# Patient Record
Sex: FEMALE | Race: BLACK OR AFRICAN AMERICAN | NOT HISPANIC OR LATINO | Employment: FULL TIME | URBAN - METROPOLITAN AREA
[De-identification: names, ages, dates, MRNs, and addresses within clinical notes are randomized per-mention and may not be internally consistent; named-entity substitution may affect disease eponyms.]

---

## 2022-01-24 ENCOUNTER — OFFICE VISIT (OUTPATIENT)
Dept: OBSTETRICS AND GYNECOLOGY | Facility: CLINIC | Age: 27
End: 2022-01-24
Payer: COMMERCIAL

## 2022-01-24 VITALS
WEIGHT: 125 LBS | BODY MASS INDEX: 23.6 KG/M2 | HEIGHT: 61 IN | SYSTOLIC BLOOD PRESSURE: 110 MMHG | DIASTOLIC BLOOD PRESSURE: 62 MMHG

## 2022-01-24 DIAGNOSIS — Z11.3 SCREENING FOR STDS (SEXUALLY TRANSMITTED DISEASES): ICD-10-CM

## 2022-01-24 DIAGNOSIS — Z30.013 ENCOUNTER FOR INITIAL PRESCRIPTION OF INJECTABLE CONTRACEPTIVE: ICD-10-CM

## 2022-01-24 DIAGNOSIS — Z01.419 ENCOUNTER FOR ANNUAL ROUTINE GYNECOLOGICAL EXAMINATION: Primary | ICD-10-CM

## 2022-01-24 DIAGNOSIS — Z12.4 SCREENING FOR CERVICAL CANCER: ICD-10-CM

## 2022-01-24 LAB
B-HCG UR QL: NEGATIVE
CTP QC/QA: YES

## 2022-01-24 PROCEDURE — 3008F BODY MASS INDEX DOCD: CPT | Mod: S$GLB,,,

## 2022-01-24 PROCEDURE — 3074F SYST BP LT 130 MM HG: CPT | Mod: S$GLB,,,

## 2022-01-24 PROCEDURE — 96372 PR INJECTION,THERAP/PROPH/DIAG2ST, IM OR SUBCUT: ICD-10-PCS | Mod: S$GLB,,,

## 2022-01-24 PROCEDURE — 81025 URINE PREGNANCY TEST: CPT | Mod: S$GLB,,,

## 2022-01-24 PROCEDURE — 1160F RVW MEDS BY RX/DR IN RCRD: CPT | Mod: S$GLB,,,

## 2022-01-24 PROCEDURE — 81025 POCT URINE PREGNANCY: ICD-10-PCS | Mod: S$GLB,,,

## 2022-01-24 PROCEDURE — 87624 HPV HI-RISK TYP POOLED RSLT: CPT

## 2022-01-24 PROCEDURE — 3074F PR MOST RECENT SYSTOLIC BLOOD PRESSURE < 130 MM HG: ICD-10-PCS | Mod: S$GLB,,,

## 2022-01-24 PROCEDURE — 87591 N.GONORRHOEAE DNA AMP PROB: CPT

## 2022-01-24 PROCEDURE — 3078F PR MOST RECENT DIASTOLIC BLOOD PRESSURE < 80 MM HG: ICD-10-PCS | Mod: S$GLB,,,

## 2022-01-24 PROCEDURE — 99385 PREV VISIT NEW AGE 18-39: CPT | Mod: 25,S$GLB,,

## 2022-01-24 PROCEDURE — 99385 PR PREVENTIVE VISIT,NEW,18-39: ICD-10-PCS | Mod: 25,S$GLB,,

## 2022-01-24 PROCEDURE — 1159F PR MEDICATION LIST DOCUMENTED IN MEDICAL RECORD: ICD-10-PCS | Mod: S$GLB,,,

## 2022-01-24 PROCEDURE — 1159F MED LIST DOCD IN RCRD: CPT | Mod: S$GLB,,,

## 2022-01-24 PROCEDURE — 96372 THER/PROPH/DIAG INJ SC/IM: CPT | Mod: S$GLB,,,

## 2022-01-24 PROCEDURE — 3078F DIAST BP <80 MM HG: CPT | Mod: S$GLB,,,

## 2022-01-24 PROCEDURE — 87491 CHLMYD TRACH DNA AMP PROBE: CPT

## 2022-01-24 PROCEDURE — 88175 CYTOPATH C/V AUTO FLUID REDO: CPT

## 2022-01-24 PROCEDURE — 3008F PR BODY MASS INDEX (BMI) DOCUMENTED: ICD-10-PCS | Mod: S$GLB,,,

## 2022-01-24 PROCEDURE — 1160F PR REVIEW ALL MEDS BY PRESCRIBER/CLIN PHARMACIST DOCUMENTED: ICD-10-PCS | Mod: S$GLB,,,

## 2022-01-24 RX ORDER — MEDROXYPROGESTERONE ACETATE 150 MG/ML
150 INJECTION, SUSPENSION INTRAMUSCULAR
Status: SHIPPED | OUTPATIENT
Start: 2022-01-24 | End: 2023-01-19

## 2022-01-24 RX ADMIN — MEDROXYPROGESTERONE ACETATE 150 MG: 150 INJECTION, SUSPENSION INTRAMUSCULAR at 09:01

## 2022-01-24 NOTE — PROGRESS NOTES
SUBJECTIVE:       Chief Complaint: Well Woman (Pt is here for Birth Control and Well Woman Exam)    History of Present Illness: Jesus Donald is a 26 y.o. female who presents for annual well-woman exam.  She has no complaints or concerns at this time.  Patient requests Depo-Provera for contraception.  She desires STD/STI testing.  She is not pregnant. Pertinent negatives include no abdominal pain, anorexia, back pain, chills, constipation, diarrhea, discolored urine, dysuria, fever, flank pain, frequency, headaches, hematuria, nausea, painful intercourse, rash, urgency or vomiting. She has not been passing clots. She has not been passing tissue. She is sexually active. It is unknown whether or not her partner has an STD. She uses condoms for contraception. Her menstrual history has been regular. Her past medical history is significant for an STD (gonorrhea) and a terminated pregnancy. Patient is a current daily smoker. There is no history of an abdominal surgery, a  section, an ectopic pregnancy, endometriosis, a gynecological surgery, herpes simplex, menorrhagia, metrorrhagia, miscarriage, ovarian cysts, perineal abscess, PID or vaginosis.     Review of Systems   Constitutional: Negative for activity change, appetite change, chills, fatigue, fever and unexpected weight change.   HENT: Negative for nasal congestion, dental problem, ear pain, facial swelling, postnasal drip, rhinorrhea, sinus pressure/congestion, sneezing, sore throat and trouble swallowing.    Eyes: Negative for discharge, itching, visual disturbance and eye dryness.   Respiratory: Negative for cough, chest tightness and shortness of breath.    Cardiovascular: Negative for chest pain and leg swelling.   Gastrointestinal: Negative for abdominal distention, abdominal pain, anorexia, change in bowel habit, constipation, diarrhea, nausea, vomiting, reflux and change in bowel habit.   Endocrine: Negative for cold intolerance, heat intolerance,  "polydipsia and polyuria.   Genitourinary: Negative for decreased urine volume, difficulty urinating, dyspareunia, dysuria, flank pain, frequency, genital sores, hematuria, hot flashes, menorrhagia, menstrual irregularity, menstrual problem, pelvic pain, urgency, vaginal bleeding, vaginal discharge, vaginal pain and vaginal dryness.   Musculoskeletal: Negative for back pain, myalgias, neck pain and neck stiffness.   Integumentary:  Negative for rash, wound, mole/lesion, breast mass, breast discharge and breast tenderness.   Allergic/Immunologic: Negative for environmental allergies and immunocompromised state.   Neurological: Negative for dizziness, syncope, weakness, light-headedness and headaches.   Hematological: Negative for adenopathy. Does not bruise/bleed easily.   Psychiatric/Behavioral: Negative for confusion, decreased concentration and sleep disturbance. The patient is not nervous/anxious.    Breast: Negative for mass and tenderness        OBJECTIVE:      /62   Ht 5' 1" (1.549 m)   Wt 56.7 kg (125 lb)   LMP 01/21/2022 (Exact Date)   BMI 23.62 kg/m²     Physical Exam:   Constitutional: She is oriented to person, place, and time. Vital signs are normal. She appears well-developed and well-nourished. She is cooperative. No distress.    HENT:   Head: Normocephalic and atraumatic.   Right Ear: External ear normal.   Left Ear: External ear normal.   Nose: Nose normal.   Mouth/Throat: Uvula is midline, oropharynx is clear and moist and mucous membranes are normal.    Eyes: Pupils are equal, round, and reactive to light. Conjunctivae and lids are normal.    Neck: Trachea normal. No thyroid mass and no thyromegaly present.    Cardiovascular: Normal rate, normal heart sounds, intact distal pulses and normal pulses.  A regularly irregular rhythm present.  Exam reveals no clubbing.   Pulse Score: 2+   Pulmonary/Chest: Effort normal and breath sounds normal. Right breast exhibits no inverted nipple, no mass, " no nipple discharge, no skin change, no tenderness, no bleeding and no swelling. Left breast exhibits no inverted nipple, no mass, no nipple discharge, no skin change, no tenderness, no bleeding and no swelling.        Abdominal: Soft. Bowel sounds are normal. She exhibits no distension. There is no abdominal tenderness. No hernia.     Genitourinary:    Inguinal canal, vagina, uterus, right adnexa and left adnexa normal.      Pelvic exam was performed with patient supine.   The external female genitalia was normal.   No external genitalia lesions identified,Genitalia hair distrobution normal .   Labial bartholins normal.Cervix is normal. Right adnexum displays no tenderness. Left adnexum displays no tenderness. No  no vaginal discharge, tenderness, rectocele or cystocele in the vagina. Vagina was moist.Cervix exhibits no motion tenderness, no lesion, no discharge and no lesion. Uterus is not tender and no uterine prolapse. Normal urethral meatus.Urethra findings: no urethral mass and no tendernessBladder findings: no bladder distention and no bladder tenderness          Musculoskeletal: Normal range of motion and moves all extremeties.      Right lower leg: No edema.      Left lower leg: No edema.      Lymphadenopathy:        Head (right side): No submandibular adenopathy present.        Head (left side): No submandibular adenopathy present.        Right cervical: No superficial cervical and no deep cervical adenopathy present.       Left cervical: No superficial cervical and no deep cervical adenopathy present.        Right: No supraclavicular adenopathy present.        Left: No supraclavicular adenopathy present.    Neurological: She is alert and oriented to person, place, and time. She has normal strength. GCS eye subscore is 4. GCS verbal subscore is 5. GCS motor subscore is 6.    Skin: Skin is warm and dry. Capillary refill takes less than 2 seconds. Abrasion (LUE) noted. Nails show no clubbing.          Psychiatric: She has a normal mood and affect. Her speech is normal and behavior is normal. Mood, affect, judgment and thought content normal.         ASSESSMENT:       1. Encounter for annual routine gynecological examination    2. Screening for STDs (sexually transmitted diseases)    3. Screening for cervical cancer    4. Encounter for initial prescription of injectable contraceptive          PLAN:   Encounter for annual routine gynecological examination  -     Liquid-Based Pap Smear, Screening  -     HPV High Risk Genotypes, PCR    Screening for STDs (sexually transmitted diseases)  -     C. trachomatis/N. gonorrhoeae by AMP DNA [facility]; Urine  -     HIV 1/2 Ag/Ab (4th Gen); Future; Expected date: 01/24/2022  -     Hepatitis Panel, Acute; Future; Expected date: 01/24/2022  -     RPR; Future; Expected date: 01/24/2022    Screening for cervical cancer  -     Liquid-Based Pap Smear, Screening  -     HPV High Risk Genotypes, PCR    Encounter for initial prescription of injectable contraceptive  -     medroxyPROGESTERone (DEPO-PROVERA) injection 150 mg  -     POCT urine pregnancy    Patient counseled today regarding contraceptive options including oral contraceptive pills, NuvaRing, transdermal patch, depo-medroxyprogesterone injection, intrauterine device, Nexplanon.  Risks, benefits, and alternatives of all these were discussed at length.  Patient has chosen Depo-Provera injections.  Urine pregnancy test negative.  Administration compliance discussed.  Patient understands this does not protect against STDs/STIs and that the only current method of protection against STDs/STIs is condom use.  Patient instructed to continue consistent condom use.  Educated on yearly STD screenings.  Discussed tobacco abuse.  Patient does not desire tobacco cessation at this time.  Patient was counseled today on American Cancer Society Pap guidelines and recommendations for yearly pelvic exams and monthly self breast exams; to see her  PCP for other health maintenance.  All questions answered.  Patient verbalized understanding.    Follow up in about 3 months (around 4/24/2022) for next Depo-Provera dose.

## 2022-01-24 NOTE — PATIENT INSTRUCTIONS
Patient Education       Dangers of Secondhand Smoke   About this topic   You may know that smoking is dangerous to your health. But, even if you don't smoke, you can be affected by people who do. You breathe in secondhand smoke when there is smoke:  · Blown into the air by a smoker. This is mainstream smoke.  · From the burning end of a cigarette, pipe, or cigar. This is sidestream smoke.     When you breathe in secondhand smoke, it is like you are smoking too. You inhale the same harmful chemicals that smokers do. Both mainstream and sidestream smoke are harmful, but the smoke from the burning end of the cigarette, pipe, or cigar has more harmful chemicals in it.  Smoking also leaves these same harmful chemicals in hair, fabric, furniture, and toys. This is what causes the odor after being around someone who smokes. You or your child may be exposed to these chemicals when you are in a room or car where people often smoke. This is true even if no one is smoking while you are there. This is called thirdhand smoke.  All kinds of tobacco contain many toxic substances. These substances cause many serious health problems.  General   Secondhand smoke is harmful for your body.  There is no safe level of secondhand smoke. Even breathing secondhand smoke for a short time can cause damage to your body. It has been found that harmful effects of second and thirdhand smoke can happen in as little as 5 minutes of exposure. It can lead to:  · Lung cancer  · Lung disease  · Heart disease and heart attack  · More infections  · Breast cancer  · Eye diseases like blindness, cataracts, and age-related macular degeneration  · Leukemia  · Breathing problems like coughing, wheezing, and shortness of breath  · Other cancers of the head and neck (throat)  Children are even more affected by secondhand smoke than adults. Your child's health may be put in danger because of secondhand smoke. Your child may be more likely to have:  · Breathing  infections, like bronchitis  · Asthma with frequent and severe attacks  · Sudden infant death syndrome (SIDS)  · Breathing problems like coughing, wheezing, and shortness of breath  · Bad ear infections and are more likely to need ear tubes to help with drainage  If you are pregnant and you inhale secondhand smoke, your baby may be in danger. You are more likely to:  · Miscarry or lose the baby  · Give birth early  · Have a baby who doesn't weigh enough  · Have a stillborn baby  · Have a baby with health problems  · Have a baby with underdeveloped lungs  · Have other problems while giving birth  What can be done to prevent this health problem?   · Don't let people smoke in your house or car.  · Don't let people smoke around your children.  · Don't go to restaurants or other public places that allow smoking.  · Make sure your place of work is smoke-free. If your work has rules against smoking, report those who are smoking.  · Encourage family and friends who smoke to quit.  · If you smoke, never smoke around your children or in places where they spend time.  · If you smoke, try to quit. This is the best way to make sure your children aren't affected by secondhand smoke.  · If you must smoke, only smoke outside. It is not enough to roll down a window or smoke near the door. The toxic chemicals will still spread throughout the whole car or room.  Helpful tips   If you smoke and want to quit, here are some tips:  · Set a date to quit smoking.  · Keep a diary and record each time you smoke. Include the time and what you are doing. Plan what you will do instead of smoking when that time or event happens again.  · Avoid places, people, and situations where you are more likely to smoke.  · Tell your family and friends about your plan to quit smoking. Let them know how to help you.  · Remove tobacco products from your home, car, and workplace.  · Ask your doctor for help. There are many resources to help you.  Where can I  learn more?   American Lung Association  https://www.lung.org/stop-smoking/smoking-facts/health-effects-of-secondhand-smoke.html   Centers for Disease Control and Prevention  https://www.cdc.gov/tobacco/data_statistics/fact_sheets/secondhand_smoke/general_facts/index.htm   National Cancer [place]  https://www.cancer.gov/about-cancer/causes-prevention/risk/tobacco/second-hand-smoke-fact-sheet   Last Reviewed Date   2021-04-14  Consumer Information Use and Disclaimer   This information is not specific medical advice and does not replace information you receive from your health care provider. This is only a brief summary of general information. It does NOT include all information about conditions, illnesses, injuries, tests, procedures, treatments, therapies, discharge instructions or life-style choices that may apply to you. You must talk with your health care provider for complete information about your health and treatment options. This information should not be used to decide whether or not to accept your health care providers advice, instructions or recommendations. Only your health care provider has the knowledge and training to provide advice that is right for you.  Copyright   Copyright © 2021 [employer] and its affiliates and/or licensors. All rights reserved.

## 2022-01-26 ENCOUNTER — PATIENT MESSAGE (OUTPATIENT)
Dept: OBSTETRICS AND GYNECOLOGY | Facility: CLINIC | Age: 27
End: 2022-01-26
Payer: COMMERCIAL

## 2022-01-26 DIAGNOSIS — A74.9 CHLAMYDIA INFECTION: Primary | ICD-10-CM

## 2022-01-26 LAB
C TRACH DNA SPEC QL NAA+PROBE: DETECTED
N GONORRHOEA DNA SPEC QL NAA+PROBE: NOT DETECTED

## 2022-01-26 RX ORDER — DOXYCYCLINE 100 MG/1
100 CAPSULE ORAL 2 TIMES DAILY
Qty: 14 CAPSULE | Refills: 0 | Status: SHIPPED | OUTPATIENT
Start: 2022-01-26 | End: 2022-02-02

## 2022-01-26 NOTE — PROGRESS NOTES
Rx for doxycycline sent. Patient notified via portal message.  Patient needs appointment for test of cure 4 weeks after completion of antibiotic.  Order placed in chart for CAMILLE.

## 2022-01-30 LAB
FINAL PATHOLOGIC DIAGNOSIS: NORMAL
Lab: NORMAL

## 2022-02-01 LAB
HPV HR 12 DNA SPEC QL NAA+PROBE: NEGATIVE
HPV16 AG SPEC QL: NEGATIVE
HPV18 DNA SPEC QL NAA+PROBE: NEGATIVE

## 2022-03-18 ENCOUNTER — PATIENT MESSAGE (OUTPATIENT)
Dept: OBSTETRICS AND GYNECOLOGY | Facility: CLINIC | Age: 27
End: 2022-03-18
Payer: COMMERCIAL

## 2022-03-22 NOTE — TELEPHONE ENCOUNTER
Attempted to reach pt to follow up on labs. No answer. LVM to call back or message on [facility]. Needs to schedule or cancel STD blood/urine screening labs.

## 2022-04-04 ENCOUNTER — PATIENT MESSAGE (OUTPATIENT)
Dept: OBSTETRICS AND GYNECOLOGY | Facility: CLINIC | Age: 27
End: 2022-04-04
Payer: COMMERCIAL